# Patient Record
Sex: MALE | Race: WHITE | Employment: OTHER | URBAN - METROPOLITAN AREA
[De-identification: names, ages, dates, MRNs, and addresses within clinical notes are randomized per-mention and may not be internally consistent; named-entity substitution may affect disease eponyms.]

---

## 2018-09-18 ENCOUNTER — HOSPITAL ENCOUNTER (OUTPATIENT)
Dept: GENERAL RADIOLOGY | Age: 58
Discharge: HOME | End: 2018-09-18
Attending: FAMILY MEDICINE

## 2018-09-18 ENCOUNTER — HOSPITAL ENCOUNTER (OUTPATIENT)
Dept: GENERAL RADIOLOGY | Age: 58
Discharge: HOME | End: 2018-09-18
Attending: FAMILY MEDICINE
Payer: COMMERCIAL

## 2018-09-18 DIAGNOSIS — I10 HYPERTENSION: ICD-10-CM

## 2018-09-18 PROCEDURE — 71046 X-RAY EXAM CHEST 2 VIEWS: CPT

## 2022-01-30 ENCOUNTER — APPOINTMENT (OUTPATIENT)
Dept: CT IMAGING | Age: 62
End: 2022-01-30
Attending: STUDENT IN AN ORGANIZED HEALTH CARE EDUCATION/TRAINING PROGRAM
Payer: COMMERCIAL

## 2022-01-30 ENCOUNTER — HOSPITAL ENCOUNTER (EMERGENCY)
Age: 62
Discharge: HOME | End: 2022-01-30
Attending: EMERGENCY MEDICINE
Payer: COMMERCIAL

## 2022-01-30 VITALS
HEIGHT: 78 IN | OXYGEN SATURATION: 97 % | WEIGHT: 260 LBS | HEART RATE: 88 BPM | RESPIRATION RATE: 18 BRPM | TEMPERATURE: 98 F | SYSTOLIC BLOOD PRESSURE: 160 MMHG | DIASTOLIC BLOOD PRESSURE: 91 MMHG | BODY MASS INDEX: 30.08 KG/M2

## 2022-01-30 DIAGNOSIS — S05.12XA TRAUMATIC HYPHEMA OF LEFT EYE, INITIAL ENCOUNTER: ICD-10-CM

## 2022-01-30 DIAGNOSIS — S05.02XA ABRASION OF LEFT CORNEA, INITIAL ENCOUNTER: Primary | ICD-10-CM

## 2022-01-30 PROCEDURE — 70480 CT ORBIT/EAR/FOSSA W/O DYE: CPT

## 2022-01-30 PROCEDURE — 99283 EMERGENCY DEPT VISIT LOW MDM: CPT

## 2022-01-30 PROCEDURE — 75810000275 HC EMERGENCY DEPT VISIT NO LEVEL OF CARE

## 2022-01-30 PROCEDURE — 90715 TDAP VACCINE 7 YRS/> IM: CPT | Performed by: STUDENT IN AN ORGANIZED HEALTH CARE EDUCATION/TRAINING PROGRAM

## 2022-01-30 PROCEDURE — 74011250636 HC RX REV CODE- 250/636: Performed by: STUDENT IN AN ORGANIZED HEALTH CARE EDUCATION/TRAINING PROGRAM

## 2022-01-30 PROCEDURE — 74011000250 HC RX REV CODE- 250: Performed by: STUDENT IN AN ORGANIZED HEALTH CARE EDUCATION/TRAINING PROGRAM

## 2022-01-30 PROCEDURE — 90471 IMMUNIZATION ADMIN: CPT

## 2022-01-30 RX ORDER — TETRACAINE HYDROCHLORIDE 5 MG/ML
1 SOLUTION OPHTHALMIC
Status: COMPLETED | OUTPATIENT
Start: 2022-01-30 | End: 2022-01-30

## 2022-01-30 RX ORDER — CIPROFLOXACIN HYDROCHLORIDE 3.5 MG/ML
1 SOLUTION/ DROPS TOPICAL 2 TIMES DAILY
Qty: 2.5 ML | Refills: 0 | Status: SHIPPED | OUTPATIENT
Start: 2022-01-30 | End: 2022-02-09

## 2022-01-30 RX ADMIN — FLUORESCEIN SODIUM 1 STRIP: 1 STRIP OPHTHALMIC at 11:55

## 2022-01-30 RX ADMIN — TETANUS TOXOID, REDUCED DIPHTHERIA TOXOID AND ACELLULAR PERTUSSIS VACCINE, ADSORBED 0.5 ML: 5; 2.5; 8; 8; 2.5 SUSPENSION INTRAMUSCULAR at 11:55

## 2022-01-30 RX ADMIN — TETRACAINE HYDROCHLORIDE 1 DROP: 5 SOLUTION OPHTHALMIC at 11:55

## 2022-01-30 NOTE — ED TRIAGE NOTES
States was using [employer] cord at 11:30 pm last night. It snapped back and hook part hit him in the L eye. Eye swollen and tearing, reports loss of vision in the eye. Can see light and dark but nothing more than that.

## 2022-01-30 NOTE — ED NOTES
ASHLEE Collins reviewed discharge instructions with the patient. The patient verbalized understanding.

## 2022-01-30 NOTE — ED PROVIDER NOTES
Patient is a 59year old male with history of afib, GERD, peripheral neuropathy who presents to ED c/o left eye injury which occurred at 11:30 last night. Patient reports he was doing something with a bungee cord when it came back and hit him in the left eye. Reports he immediately felt pain and had decreased vision in this eye. Reports he woke up this morning and continues to have photophobia, decreased vision in left eye, and eye discharge. Reports no pain in the eye unless he looks at the light. States \"looking through the bottom of a coke bottle. \" He has patched his eye since the injury. He reports history of cataract surgery 2 years prior. Last tetanus was > 10 years prior. Past Medical History:   Diagnosis Date    Arrhythmia     a fib-resolved    GERD (gastroesophageal reflux disease)     Other ill-defined conditions     neuropathy-lower extremeties  bilaterally(left more extreme)    Unspecified adverse effect of anesthesia     patient can't have Propofol - egg allergy       Past Surgical History:   Procedure Laterality Date    HX CHOLECYSTECTOMY  2004    HX HERNIA REPAIR  1978    HX KNEE ARTHROSCOPY  1996    HX ORTHOPAEDIC  1964    finger surgery    HX ORTHOPAEDIC  1996    Rt wrist     HX ORTHOPAEDIC  2008    shoulder scope    HX TONSILLECTOMY      VASCULAR SURGERY PROCEDURE UNLIST  2007    Sural nerve biopsy         No family history on file.     Social History     Socioeconomic History    Marital status:      Spouse name: Not on file    Number of children: Not on file    Years of education: Not on file    Highest education level: Not on file   Occupational History    Not on file   Tobacco Use    Smoking status: Never Smoker    Smokeless tobacco: Never Used   Substance and Sexual Activity    Alcohol use: Yes     Comment: occas    Drug use: No    Sexual activity: Not on file   Other Topics Concern    Not on file   Social History Narrative    Not on file     Social Determinants of Health     Financial Resource Strain:     Difficulty of Paying Living Expenses: Not on file   Food Insecurity:     Worried About Running Out of Food in the Last Year: Not on file    Nelson of Food in the Last Year: Not on file   Transportation Needs:     Lack of Transportation (Medical): Not on file    Lack of Transportation (Non-Medical): Not on file   Physical Activity:     Days of Exercise per Week: Not on file    Minutes of Exercise per Session: Not on file   Stress:     Feeling of Stress : Not on file   Social Connections:     Frequency of Communication with Friends and Family: Not on file    Frequency of Social Gatherings with Friends and Family: Not on file    Attends [employer] Services: Not on file    Active Member of [address] [place] [address] or Organizations: Not on file    Attends Club or Organization Meetings: Not on file    Marital Status: Not on file   Intimate Partner Violence:     Fear of Current or Ex-Partner: Not on file    Emotionally Abused: Not on file    Physically Abused: Not on file    Sexually Abused: Not on file   Housing Stability:     Unable to Pay for Housing in the Last Year: Not on file    Number of [place] in the Last Year: Not on file    Unstable Housing in the Last Year: Not on file         ALLERGIES: Chocolate flavor [flavoring agent] and Egg    Review of Systems   Eyes: Positive for photophobia, pain, discharge, redness and visual disturbance. Negative for itching. Gastrointestinal: Negative for nausea and vomiting. Neurological: Negative for dizziness, tremors, seizures, syncope, facial asymmetry, speech difficulty, weakness, light-headedness, numbness and headaches. All other systems reviewed and are negative.       Vitals:    01/30/22 1106 01/30/22 1337   BP: (!) 160/91    Pulse: (!) 111 88   Resp: 18    Temp: 98 °F (36.7 °C)    SpO2: 97%    Weight: 117.9 kg (260 lb)    Height: 6' 8\" (2.032 m)             Physical Exam  Vitals and nursing note reviewed. Constitutional:       Appearance: Normal appearance. He is well-developed. HENT:      Head: Normocephalic and atraumatic. Nose: Nose normal.      Mouth/Throat:      Mouth: Mucous membranes are moist.   Eyes:      General: Lids are normal.      Extraocular Movements: Extraocular movements intact. Conjunctiva/sclera:      Left eye: Left conjunctiva is injected. Chemosis present. Pupils: Pupils are equal, round, and reactive to light. Left eye: Pupil is round. Corneal abrasion present. No fluorescein uptake. Génesis exam negative. Comments: Left eye: sclera injected. Eye discharge present. Hyphema present. Large corneal abrasion from the 6 oclock to 9 oclock position. Decreased visual acuity. Cardiovascular:      Rate and Rhythm: Normal rate. Pulses: Normal pulses. Heart sounds: S1 normal and S2 normal.   Pulmonary:      Effort: Pulmonary effort is normal. No accessory muscle usage. Abdominal:      General: Abdomen is flat. Palpations: Abdomen is soft. Musculoskeletal:         General: Normal range of motion. Cervical back: Normal range of motion and neck supple. Skin:     General: Skin is warm and dry. Capillary Refill: Capillary refill takes less than 2 seconds. Neurological:      General: No focal deficit present. Mental Status: He is alert and oriented to person, place, and time. Mental status is at baseline. Psychiatric:         Attention and Perception: Attention normal.         Mood and Affect: Mood and affect normal.         Speech: Speech normal.         Behavior: Behavior normal. Behavior is cooperative. Thought Content: Thought content normal.         Cognition and Memory: Cognition normal.         Judgment: Judgment normal.          MDM  Number of Diagnoses or Management Options  Diagnosis management comments: CT negative for open globe rupture. He does have a very large corneal abrasion present.  Suspect traumatic iritis and corneal abrasion. Consulted ophthalmology and spoke with Dr. Eran Singh who advised to start patient on antibiotics and to keep the eye covered and they will see patient in office tomorrow morning. Discussed results and plan with patient. All questions addressed and answered. Amount and/or Complexity of Data Reviewed  Tests in the radiology section of CPT®: reviewed  Discuss the patient with other providers: yes (Dr. Keegan Mora, ED attending )      ED Course as of 01/30/22 1337   Sun Jan 30, 2022   1233 CT ORBIT POST FOSSA WO CONT: IMPRESSION: No evidence for orbit or globe fracture.  [KG]      ED Course User Index  [KG] Tayler Motta       Procedures

## 2022-01-30 NOTE — DISCHARGE INSTRUCTIONS
Use antibiotics eye drops as prescribed. Keep eye covered to help alleviate pain. Please follow-up with Dr. Abhishek Lewis at [facility] tomorrow morning at 8:30-9:00 am to be seen for follow-up.

## 2022-06-15 ENCOUNTER — TRANSCRIBE ORDER (OUTPATIENT)
Dept: SCHEDULING | Age: 62
End: 2022-06-15

## 2022-06-15 DIAGNOSIS — R60.0 LOCALIZED EDEMA: Primary | ICD-10-CM

## 2022-06-17 ENCOUNTER — HOSPITAL ENCOUNTER (OUTPATIENT)
Dept: ULTRASOUND IMAGING | Age: 62
Discharge: HOME | End: 2022-06-17
Attending: FAMILY MEDICINE
Payer: COMMERCIAL

## 2022-06-17 DIAGNOSIS — R60.0 LOCALIZED EDEMA: ICD-10-CM

## 2022-06-17 PROCEDURE — 93970 EXTREMITY STUDY: CPT

## 2023-04-05 ENCOUNTER — OFFICE VISIT (OUTPATIENT)
Dept: URGENT CARE | Age: 63
End: 2023-04-05

## 2023-04-05 RX ORDER — HYDROCHLOROTHIAZIDE 25 MG/1: TABLET ORAL

## 2023-04-05 RX ORDER — IRBESARTAN 150 MG/1: TABLET ORAL

## 2023-04-05 RX ORDER — DOXYCYCLINE 100 MG/1
CAPSULE ORAL
Start: 2023-04-04

## 2023-04-05 RX ORDER — AMLODIPINE BESYLATE 2.5 MG/1: TABLET ORAL

## 2023-04-05 RX ORDER — LOSARTAN POTASSIUM 50 MG/1: TABLET ORAL

## 2023-04-05 RX ORDER — CARVEDILOL 6.25 MG/1: TABLET ORAL

## 2023-04-05 RX ORDER — FUROSEMIDE 20 MG/1: TABLET ORAL

## 2023-04-05 NOTE — PROGRESS NOTES
Here for pain after fall  Onset 1 day ago  Hx of CIDP  Lost balance while using leaf blower and felll resulting in jury to left pinky finger and left wrist  Pain worse with pressing on area  Has been wearing a brace that seems to help some  Denies swelling, weakness, numbness or tingling  Overall unchanged. Past Medical History:   Diagnosis Date    Arrhythmia     a fib-resolved    GERD (gastroesophageal reflux disease)     Other ill-defined conditions(799.89)     neuropathy-lower extremeties  bilaterally(left more extreme)    Unspecified adverse effect of anesthesia     patient can't have Propofol - egg allergy        Past Surgical History:   Procedure Laterality Date    HX CHOLECYSTECTOMY  2004    HX HERNIA REPAIR  1978    HX KNEE ARTHROSCOPY  1996    HX ORTHOPAEDIC  1964    finger surgery    HX ORTHOPAEDIC  1996    Rt wrist     HX ORTHOPAEDIC  2008    shoulder scope    HX TONSILLECTOMY      OR UNLISTED PROCEDURE VASCULAR SURGERY  2007    Sural nerve biopsy         History reviewed. No pertinent family history.      Social History     Socioeconomic History    Marital status:      Spouse name: Not on file    Number of children: Not on file    Years of education: Not on file    Highest education level: Not on file   Occupational History    Not on file   Tobacco Use    Smoking status: Never    Smokeless tobacco: Never   Substance and Sexual Activity    Alcohol use: Yes     Comment: occas    Drug use: No    Sexual activity: Not on file   Other Topics Concern    Not on file   Social History Narrative    Not on file     Social Determinants of Health     Financial Resource Strain: Not on file   Food Insecurity: Not on file   Transportation Needs: Not on file   Physical Activity: Not on file   Stress: Not on file   Social Connections: Not on file   Intimate Partner Violence: Not on file   Housing Stability: Not on file                ALLERGIES: Chocolate flavor, Chocolate flavor [flavoring agent], and Egg    Review of Systems   All other systems reviewed and are negative. Vitals:    04/05/23 1630 04/05/23 1736   BP: (!) 163/106 (!) 168/91   Pulse: 91    Resp: 18    Temp: 97.7 °F (36.5 °C)    SpO2: 96%    Weight: 252 lb (114.3 kg)    Height: 6' 8\" (2.032 m)        Physical Exam  Constitutional:       General: He is not in acute distress. Appearance: He is well-developed. HENT:      Head: Normocephalic and atraumatic. Cardiovascular:      Rate and Rhythm: Normal rate and regular rhythm. Heart sounds: Normal heart sounds. Pulmonary:      Effort: Pulmonary effort is normal.      Breath sounds: Normal breath sounds. Musculoskeletal:        Hands:       Comments: No scaphoid TTP of wrist   Skin:     General: Skin is warm and dry. Neurological:      Mental Status: He is alert and oriented to person, place, and time. Psychiatric:         Behavior: Behavior normal.         Thought Content: Thought content normal.         Judgment: Judgment normal.       MDM     Differential Diagnosis; Clinical Impression; Plan:       CLINICAL IMPRESSION:  (M25.532) Left wrist pain  (primary encounter diagnosis)  (M79.645) Pain of finger of left hand  (W18.30XA) Ground-level fall  (I10) Hypertension, unspecified type      Plan:  Fall with injury to finger and wrist.  Suspect sprain/contusion  No fracture on xray, chronic ossicle noted (see below report)  No fracture of finger or wrist  Cool compresses  May continue brace for 1 week then wean off  AROM home exercises. Schedule with PCP or Urgent Care immediately for worsening or new symptoms. See your PCP if there is not at least some improvement in symptoms within the next 1 week  You should see your PCP for updates on your routine health maintenance.              Procedures